# Patient Record
Sex: FEMALE | Race: BLACK OR AFRICAN AMERICAN | NOT HISPANIC OR LATINO | URBAN - METROPOLITAN AREA
[De-identification: names, ages, dates, MRNs, and addresses within clinical notes are randomized per-mention and may not be internally consistent; named-entity substitution may affect disease eponyms.]

---

## 2018-12-10 ENCOUNTER — INPATIENT (INPATIENT)
Facility: HOSPITAL | Age: 43
LOS: 0 days | Discharge: HOME | End: 2018-12-11
Attending: HOSPITALIST | Admitting: HOSPITALIST
Payer: MEDICARE

## 2018-12-10 VITALS
WEIGHT: 169.98 LBS | HEART RATE: 117 BPM | HEIGHT: 67 IN | TEMPERATURE: 98 F | DIASTOLIC BLOOD PRESSURE: 104 MMHG | RESPIRATION RATE: 24 BRPM | SYSTOLIC BLOOD PRESSURE: 164 MMHG

## 2018-12-10 DIAGNOSIS — I10 ESSENTIAL (PRIMARY) HYPERTENSION: ICD-10-CM

## 2018-12-10 DIAGNOSIS — M32.8 OTHER FORMS OF SYSTEMIC LUPUS ERYTHEMATOSUS: ICD-10-CM

## 2018-12-10 DIAGNOSIS — R11.2 NAUSEA WITH VOMITING, UNSPECIFIED: ICD-10-CM

## 2018-12-10 LAB
ALBUMIN SERPL ELPH-MCNC: 2.1 G/DL — LOW (ref 3.3–5)
ALP SERPL-CCNC: 117 U/L — SIGNIFICANT CHANGE UP (ref 40–120)
ALT FLD-CCNC: 18 U/L — SIGNIFICANT CHANGE UP (ref 12–78)
AMYLASE P1 CFR SERPL: 139 U/L — HIGH (ref 25–115)
ANION GAP SERPL CALC-SCNC: 5 MMOL/L — SIGNIFICANT CHANGE UP (ref 5–17)
APTT BLD: 42 SEC — HIGH (ref 28.5–37)
AST SERPL-CCNC: 24 U/L — SIGNIFICANT CHANGE UP (ref 15–37)
BASE EXCESS BLDV CALC-SCNC: -0.2 MMOL/L — SIGNIFICANT CHANGE UP (ref -2–2)
BASOPHILS # BLD AUTO: 0.01 K/UL — SIGNIFICANT CHANGE UP (ref 0–0.2)
BASOPHILS NFR BLD AUTO: 0.1 % — SIGNIFICANT CHANGE UP (ref 0–2)
BILIRUB SERPL-MCNC: 0.2 MG/DL — SIGNIFICANT CHANGE UP (ref 0.2–1.2)
BLOOD GAS COMMENTS, VENOUS: SIGNIFICANT CHANGE UP
BUN SERPL-MCNC: 16 MG/DL — SIGNIFICANT CHANGE UP (ref 7–23)
CALCIUM SERPL-MCNC: 7.9 MG/DL — LOW (ref 8.5–10.1)
CHLORIDE SERPL-SCNC: 113 MMOL/L — HIGH (ref 96–108)
CK MB BLD-MCNC: <1.4 % — SIGNIFICANT CHANGE UP (ref 0–3.5)
CK MB CFR SERPL CALC: <1 NG/ML — SIGNIFICANT CHANGE UP (ref 0.5–3.6)
CK SERPL-CCNC: 74 U/L — SIGNIFICANT CHANGE UP (ref 26–192)
CO2 SERPL-SCNC: 28 MMOL/L — SIGNIFICANT CHANGE UP (ref 22–31)
CREAT SERPL-MCNC: 0.75 MG/DL — SIGNIFICANT CHANGE UP (ref 0.5–1.3)
EOSINOPHIL # BLD AUTO: 0.03 K/UL — SIGNIFICANT CHANGE UP (ref 0–0.5)
EOSINOPHIL NFR BLD AUTO: 0.4 % — SIGNIFICANT CHANGE UP (ref 0–6)
GAS PNL BLDV: SIGNIFICANT CHANGE UP
GLUCOSE SERPL-MCNC: 93 MG/DL — SIGNIFICANT CHANGE UP (ref 70–99)
HCG SERPL-ACNC: 2 MIU/ML — SIGNIFICANT CHANGE UP
HCO3 BLDV-SCNC: 24 MMOL/L — SIGNIFICANT CHANGE UP (ref 21–29)
HCT VFR BLD CALC: 37 % — SIGNIFICANT CHANGE UP (ref 34.5–45)
HGB BLD-MCNC: 11.9 G/DL — SIGNIFICANT CHANGE UP (ref 11.5–15.5)
HOROWITZ INDEX BLDV+IHG-RTO: 21 — SIGNIFICANT CHANGE UP
IMM GRANULOCYTES NFR BLD AUTO: 0.3 % — SIGNIFICANT CHANGE UP (ref 0–1.5)
INR BLD: 1.08 RATIO — SIGNIFICANT CHANGE UP (ref 0.88–1.16)
LACTATE SERPL-SCNC: 1.5 MMOL/L — SIGNIFICANT CHANGE UP (ref 0.7–2)
LIDOCAIN IGE QN: 173 U/L — SIGNIFICANT CHANGE UP (ref 73–393)
LYMPHOCYTES # BLD AUTO: 0.32 K/UL — LOW (ref 1–3.3)
LYMPHOCYTES # BLD AUTO: 4.5 % — LOW (ref 13–44)
MCHC RBC-ENTMCNC: 29 PG — SIGNIFICANT CHANGE UP (ref 27–34)
MCHC RBC-ENTMCNC: 32.2 GM/DL — SIGNIFICANT CHANGE UP (ref 32–36)
MCV RBC AUTO: 90.2 FL — SIGNIFICANT CHANGE UP (ref 80–100)
MONOCYTES # BLD AUTO: 0.32 K/UL — SIGNIFICANT CHANGE UP (ref 0–0.9)
MONOCYTES NFR BLD AUTO: 4.5 % — SIGNIFICANT CHANGE UP (ref 2–14)
NEUTROPHILS # BLD AUTO: 6.47 K/UL — SIGNIFICANT CHANGE UP (ref 1.8–7.4)
NEUTROPHILS NFR BLD AUTO: 90.2 % — HIGH (ref 43–77)
NRBC # BLD: 0 /100 WBCS — SIGNIFICANT CHANGE UP (ref 0–0)
PCO2 BLDV: 39 MMHG — SIGNIFICANT CHANGE UP (ref 35–50)
PH BLDV: 7.4 — SIGNIFICANT CHANGE UP (ref 7.35–7.45)
PLATELET # BLD AUTO: 334 K/UL — SIGNIFICANT CHANGE UP (ref 150–400)
PO2 BLDV: <45 MMHG — SIGNIFICANT CHANGE UP (ref 25–45)
POTASSIUM SERPL-MCNC: 3.9 MMOL/L — SIGNIFICANT CHANGE UP (ref 3.5–5.3)
POTASSIUM SERPL-SCNC: 3.9 MMOL/L — SIGNIFICANT CHANGE UP (ref 3.5–5.3)
PROT SERPL-MCNC: 7.3 GM/DL — SIGNIFICANT CHANGE UP (ref 6–8.3)
PROTHROM AB SERPL-ACNC: 12.1 SEC — SIGNIFICANT CHANGE UP (ref 10–12.9)
RBC # BLD: 4.1 M/UL — SIGNIFICANT CHANGE UP (ref 3.8–5.2)
RBC # FLD: 13.4 % — SIGNIFICANT CHANGE UP (ref 10.3–14.5)
SAO2 % BLDV: 75 % — SIGNIFICANT CHANGE UP (ref 67–88)
SODIUM SERPL-SCNC: 146 MMOL/L — HIGH (ref 135–145)
TROPONIN I SERPL-MCNC: <.015 NG/ML — SIGNIFICANT CHANGE UP (ref 0.01–0.04)
WBC # BLD: 7.17 K/UL — SIGNIFICANT CHANGE UP (ref 3.8–10.5)
WBC # FLD AUTO: 7.17 K/UL — SIGNIFICANT CHANGE UP (ref 3.8–10.5)

## 2018-12-10 PROCEDURE — 93010 ELECTROCARDIOGRAM REPORT: CPT

## 2018-12-10 PROCEDURE — 99285 EMERGENCY DEPT VISIT HI MDM: CPT

## 2018-12-10 PROCEDURE — 74174 CTA ABD&PLVS W/CONTRAST: CPT | Mod: 26

## 2018-12-10 PROCEDURE — 71045 X-RAY EXAM CHEST 1 VIEW: CPT | Mod: 26

## 2018-12-10 PROCEDURE — 99223 1ST HOSP IP/OBS HIGH 75: CPT

## 2018-12-10 PROCEDURE — 71275 CT ANGIOGRAPHY CHEST: CPT | Mod: 26

## 2018-12-10 RX ORDER — LANOLIN ALCOHOL/MO/W.PET/CERES
3 CREAM (GRAM) TOPICAL AT BEDTIME
Qty: 0 | Refills: 0 | Status: DISCONTINUED | OUTPATIENT
Start: 2018-12-10 | End: 2018-12-11

## 2018-12-10 RX ORDER — LISINOPRIL 2.5 MG/1
40 TABLET ORAL DAILY
Qty: 0 | Refills: 0 | Status: DISCONTINUED | OUTPATIENT
Start: 2018-12-10 | End: 2018-12-11

## 2018-12-10 RX ORDER — SODIUM CHLORIDE 9 MG/ML
1000 INJECTION, SOLUTION INTRAVENOUS
Qty: 0 | Refills: 0 | Status: DISCONTINUED | OUTPATIENT
Start: 2018-12-10 | End: 2018-12-11

## 2018-12-10 RX ORDER — SODIUM CHLORIDE 9 MG/ML
1000 INJECTION INTRAMUSCULAR; INTRAVENOUS; SUBCUTANEOUS ONCE
Qty: 0 | Refills: 0 | Status: COMPLETED | OUTPATIENT
Start: 2018-12-10 | End: 2018-12-10

## 2018-12-10 RX ORDER — IBUPROFEN 200 MG
600 TABLET ORAL EVERY 6 HOURS
Qty: 0 | Refills: 0 | Status: DISCONTINUED | OUTPATIENT
Start: 2018-12-10 | End: 2018-12-11

## 2018-12-10 RX ORDER — ONDANSETRON 8 MG/1
4 TABLET, FILM COATED ORAL EVERY 6 HOURS
Qty: 0 | Refills: 0 | Status: DISCONTINUED | OUTPATIENT
Start: 2018-12-10 | End: 2018-12-11

## 2018-12-10 RX ORDER — HYDROXYCHLOROQUINE SULFATE 200 MG
100 TABLET ORAL
Qty: 0 | Refills: 0 | Status: DISCONTINUED | OUTPATIENT
Start: 2018-12-10 | End: 2018-12-11

## 2018-12-10 RX ORDER — MYCOPHENOLATE MOFETIL 250 MG/1
1500 CAPSULE ORAL DAILY
Qty: 0 | Refills: 0 | Status: DISCONTINUED | OUTPATIENT
Start: 2018-12-10 | End: 2018-12-11

## 2018-12-10 RX ORDER — HYDROMORPHONE HYDROCHLORIDE 2 MG/ML
0.5 INJECTION INTRAMUSCULAR; INTRAVENOUS; SUBCUTANEOUS ONCE
Qty: 0 | Refills: 0 | Status: DISCONTINUED | OUTPATIENT
Start: 2018-12-10 | End: 2018-12-10

## 2018-12-10 RX ORDER — PANTOPRAZOLE SODIUM 20 MG/1
40 TABLET, DELAYED RELEASE ORAL DAILY
Qty: 0 | Refills: 0 | Status: DISCONTINUED | OUTPATIENT
Start: 2018-12-10 | End: 2018-12-11

## 2018-12-10 RX ORDER — NICOTINE POLACRILEX 2 MG
2 GUM BUCCAL EVERY 8 HOURS
Qty: 0 | Refills: 0 | Status: DISCONTINUED | OUTPATIENT
Start: 2018-12-10 | End: 2018-12-11

## 2018-12-10 RX ORDER — HYDROMORPHONE HYDROCHLORIDE 2 MG/ML
1 INJECTION INTRAMUSCULAR; INTRAVENOUS; SUBCUTANEOUS ONCE
Qty: 0 | Refills: 0 | Status: DISCONTINUED | OUTPATIENT
Start: 2018-12-10 | End: 2018-12-10

## 2018-12-10 RX ORDER — NICOTINE POLACRILEX 2 MG
1 GUM BUCCAL DAILY
Qty: 0 | Refills: 0 | Status: DISCONTINUED | OUTPATIENT
Start: 2018-12-10 | End: 2018-12-11

## 2018-12-10 RX ADMIN — Medication 600 MILLIGRAM(S): at 21:29

## 2018-12-10 RX ADMIN — LISINOPRIL 40 MILLIGRAM(S): 2.5 TABLET ORAL at 18:21

## 2018-12-10 RX ADMIN — HYDROMORPHONE HYDROCHLORIDE 0.5 MILLIGRAM(S): 2 INJECTION INTRAMUSCULAR; INTRAVENOUS; SUBCUTANEOUS at 13:52

## 2018-12-10 RX ADMIN — HYDROMORPHONE HYDROCHLORIDE 1 MILLIGRAM(S): 2 INJECTION INTRAMUSCULAR; INTRAVENOUS; SUBCUTANEOUS at 14:06

## 2018-12-10 RX ADMIN — HYDROMORPHONE HYDROCHLORIDE 0.5 MILLIGRAM(S): 2 INJECTION INTRAMUSCULAR; INTRAVENOUS; SUBCUTANEOUS at 13:01

## 2018-12-10 RX ADMIN — SODIUM CHLORIDE 1000 MILLILITER(S): 9 INJECTION INTRAMUSCULAR; INTRAVENOUS; SUBCUTANEOUS at 13:51

## 2018-12-10 RX ADMIN — SODIUM CHLORIDE 100 MILLILITER(S): 9 INJECTION, SOLUTION INTRAVENOUS at 21:28

## 2018-12-10 RX ADMIN — HYDROMORPHONE HYDROCHLORIDE 1 MILLIGRAM(S): 2 INJECTION INTRAMUSCULAR; INTRAVENOUS; SUBCUTANEOUS at 14:40

## 2018-12-10 RX ADMIN — SODIUM CHLORIDE 2000 MILLILITER(S): 9 INJECTION INTRAMUSCULAR; INTRAVENOUS; SUBCUTANEOUS at 13:01

## 2018-12-10 RX ADMIN — Medication 30 MILLIGRAM(S): at 21:28

## 2018-12-10 RX ADMIN — Medication 600 MILLIGRAM(S): at 22:29

## 2018-12-10 RX ADMIN — ONDANSETRON 4 MILLIGRAM(S): 8 TABLET, FILM COATED ORAL at 21:28

## 2018-12-10 NOTE — H&P ADULT - ASSESSMENT
43d with sle with acute flare requiring at least two days of hospitalization     IMPROVE VTE Individual Risk Assessment    RISK                                                          Points  [] Previous VTE                                           3  [] Thrombophilia                                        2  [] Lower limb paralysis                              2   [] Current Cancer                                       2   [] Immobilization > 24 hrs                        1  [] ICU/CCU stay > 24 hours                       1  [] Age > 60                                                   1    IMPROVE VTE Score:0

## 2018-12-10 NOTE — H&P ADULT - HISTORY OF PRESENT ILLNESS
42 yo female with history of lupus presents with total body pain, abdominal pain, sob, nausea, vomiting, diarrhea since yesterday. Patient states she has had fever, chills. Patient denies recent travel, rash. Pain 10/10. Patient states she has rib pain. 44 yo female with history of lupus presents with total body pain, abdominal pain, sob, nausea, vomiting, diarrhea since yesterday. Patient states she has had fever, chills. Patient denies recent travel, rash. Pain 10/10. Patient states she has rib pain. - most of the pain is in her chest pain from vomiting

## 2018-12-10 NOTE — ED ADULT NURSE NOTE - NSIMPLEMENTINTERV_GEN_ALL_ED
Implemented All Universal Safety Interventions:  [place] to call system. Call bell, personal items and telephone within reach. Instruct patient to call for assistance. Room bathroom lighting operational. Non-slip footwear when patient is off stretcher. Physically safe environment: no spills, clutter or unnecessary equipment. Stretcher in lowest position, wheels locked, appropriate side rails in place.

## 2018-12-10 NOTE — ED ADULT NURSE NOTE - OBJECTIVE STATEMENT
PT is a 43YOF who is here for abdominal pain, flank pain bilaterally. Pt states that she chronically has pain due to her lupus, but today the pain is "uncontrollable". Pt states that her pain is causing her to have difficulty breathing and it is causing her to have pain in her sides. Pt is in distress and is using her accessory muscles to breathe.

## 2018-12-10 NOTE — ED ADULT NURSE REASSESSMENT NOTE - NS ED NURSE REASSESS COMMENT FT1
Report given to receiving RN,pts history, current condition and reason for admission discussed and reviewed, pt currently in stable condition, IV patent and running, dressing is clean dry and intact, pt aware of plan of care. Pt education deemed successful afte successful patient teach back proves proficiency.

## 2018-12-10 NOTE — CONSULT NOTE ADULT - SUBJECTIVE AND OBJECTIVE BOX
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  Yesterday, she began to experience severe diffuse pain throughout her body, including pleuritic chest pain, causing difficulty taking deep breaths.  THe pain then progressively worsened, prompting her to come in to the ED.  Of note, pt had been on Plaquenil and Cellcept, but has has been off both since running out of them (off Cellcept for about 6 months, and off Plaquenil abut 1 month).  Of note, pt dx'ed w/ SLE at age 17 - initially experienced joint pains and rashes, followed by oral ulcers, and alopecia.  Several years ago, she was diagnosed with lupus nephritis.  She was treated initially with Cellcept, but did not respond adequately, so she was then treated with Cytoxan, after which she was put back on Cellcept for maintenance treatment.      PAST MEDICAL & SURGICAL HISTORY:  Essential hypertension  Lupus  left THR      ROS: No current oral ulcers, rashes, alopecia, photosensitivity, dry eye/dry mouth, Raynaud's, dysphagia, focal weakness, or eye symptoms.    MEDICATIONS  (STANDING):  dextrose 5% + sodium chloride 0.9%. 1000 milliLiter(s) (100 mL/Hr) IV Continuous <Continuous>  hydroxychloroquine 100 milliGRAM(s) Oral two times a day  lisinopril 40 milliGRAM(s) Oral daily  mycophenolate mofetil 1500 milliGRAM(s) Oral daily  nicotine -  14 mG/24Hr(s) Patch 1 patch Transdermal daily  pantoprazole  Injectable 40 milliGRAM(s) IV Push daily    MEDICATIONS  (PRN):  nicotine  Polacrilex Gum 2 milliGRAM(s) Oral every 8 hours PRN breakthrough cravings  ondansetron Injectable 4 milliGRAM(s) IV Push every 6 hours PRN Nausea and/or Vomiting      Allergies    Imuran (Other)    Intolerances        FAMILY HISTORY:      SOCIAL HISTORY:  Tobacco--   none            Vital Signs Last 24 Hrs  T(C): 37 (10 Dec 2018 17:59), Max: 37 (10 Dec 2018 17:59)  T(F): 98.6 (10 Dec 2018 17:59), Max: 98.6 (10 Dec 2018 17:59)  HR: 96 (10 Dec 2018 17:59) (92 - 117)  BP: 168/99 (10 Dec 2018 17:59) (125/85 - 168/99)  BP(mean): --  RR: 18 (10 Dec 2018 17:59) (18 - 24)  SpO2: 99% (10 Dec 2018 17:59) (97% - 99%)    PHYSICAL EXAM:  General :  NAD  HEENT--  no oral ulcers  Nodes--   LAD  Lungs--  CTA B/L  Heart--  RRR, nlS1 &S2 normal;   Abdomen--  soft, NT, ND +BS  Skin:  chronic-appearing facial rash  Musculoskeletal exam:  (+)swelling/tenderness in all B/L PIP's, wrists;  (+)tenderness in all hand joints, B/L elbows, B/L knees, B/L ankles    LABS:                        11.9   7.17  )-----------( 334      ( 10 Dec 2018 13:11 )             37.0     12-10    146<H>  |  113<H>  |  16  ----------------------------<  93  3.9   |  28  |  0.75    Ca    7.9<L>      10 Dec 2018 13:11    TPro  7.3  /  Alb  2.1<L>  /  TBili  0.2  /  DBili  x   /  AST  24  /  ALT  18  /  AlkPhos  117  12-10    PT/INR - ( 10 Dec 2018 13:11 )   PT: 12.1 sec;   INR: 1.08 ratio         PTT - ( 10 Dec 2018 13:11 )  PTT:42.0 sec      RADIOLOGY & ADDITIONAL STUDIES:  < from: CT Angio Chest w/ IV Cont (12.10.18 @ 15:28) >  EXAM:  CT ANGIO ABD PELV (W)AW IC                          EXAM:  CT ANGIO CHEST (W)AW IC                            PROCEDURE DATE:  12/10/2018          INTERPRETATION:  CLINICAL INFORMATION: Shortness of breath, nausea,   vomiting, and abdominal pain    COMPARISON: None.    PROCEDURE:   CT Angiography of the Chest, Abdomen and Pelvis.   Precontrast imaging was performed through the chest followed by arterial   phase imaging of the chest, abdomen and pelvis.  Intravenous contrast: 90 ml Omnipaque 350. 10 ml discarded.  Oral contrast:None.  Sagittal and coronal reformats were performed as well as 3D (MIP)   reconstructions.    FINDINGS:    CHEST:     LUNGS AND LARGE AIRWAYS: Patent central airways. Bilateral dependent and   basilar atelectasis.  PLEURA: No pleural effusion.  VESSELS: No thoracic aortic mural hematoma, aneurysm, or dissection. No   central pulmonary filling defects identified.  HEART: Heart size is normal. No pericardial effusion.  MEDIASTINUM AND FLORINDA: Several prominentlymph nodes.  CHEST WALL AND LOWER NECK: Enlarged axillary lymph nodes.    ABDOMEN AND PELVIS:    LIVER: Within normal limits.  BILE DUCTS: Normal caliber.  GALLBLADDER: Within normal limits.  SPLEEN: Within normal limits.  PANCREAS: Within normal limits.  ADRENALS: Within normal limits.  KIDNEYS/URETERS: Slightly heterogeneous renal enhancement. Nonobstructive   renal calculi measuring up to 3 mm. No hydronephrosis.    BLADDER: Within normal limits.  REPRODUCTIVE ORGANS: Fibroid uterus.    BOWEL: No bowel obstruction. Appendix within normal limits.  PERITONEUM: Mild pelvic fluid.  VESSELS:  The abdominal aorta demonstrates a normal course and caliber.   There is no evidence for abdominal aortic aneurysm or intraluminal flap   to suggest aortic dissection. No periaortic fluid collections are seen.   The origins of the celiac artery, superior mesenteric artery, bilateral   renal arteries, and inferior mesenteric artery are patent. Common hepatic   artery arises from the superior mesentericartery (normal variant).  RETROPERITONEUM: Mildly enlarged retroperitoneal and pelvic lymph nodes.    ABDOMINAL WALL: Tiny fat-containing umbilical hernia.  BONES: Right femoral head avascular necrosis. Status post left hip   arthroplasty. Mild spinal degenerative changes.    IMPRESSION:     No aortic aneurysm or dissection.    < end of copied text >

## 2018-12-10 NOTE — CONSULT NOTE ADULT - ASSESSMENT
43 year old female presents with abdominal symptoms (abd pain, nausea, diarrhea), arthritis, and diffuse pain, including pleuritic chest pain.  Overall presentation most c/w SLE flare, though nausea and diarrhea are less typical of SLE flares, masoud given unremarkable CT abdomen.  Suspect abdominal symptoms may be due to viral gastroenteritis, which could possibly be the underlying trigger of her current flare.    - Will start Solumedrol 30mg IV daily.    - Cont Plaquenil, Cellcept.    - Would try to obtain recent records from her outside rheumatologist.    - Supportive care for likely gastroenteritis.    - Analgesia as per primary team.    - Will order labs, including dsDNA, C3, C4, UA, U Pr/Cr
cervical collar

## 2018-12-10 NOTE — ED PROVIDER NOTE - OBJECTIVE STATEMENT
44 yo female with history of lupus presents with total body pain, abdominal pain, sob, nausea, vomiting, diarrhea since yesterday. Patient states she has had fever, chills. Patient denies recent travel, rash. Pain 10/10. Patient states she has rib pain.

## 2018-12-10 NOTE — H&P ADULT - NSHPLABSRESULTS_GEN_ALL_CORE
11.9   7.17  )-----------( 334      ( 10 Dec 2018 13:11 )             37.0   12-10    146<H>  |  113<H>  |  16  ----------------------------<  93  3.9   |  28  |  0.75    Ca    7.9<L>      10 Dec 2018 13:11    TPro  7.3  /  Alb  2.1<L>  /  TBili  0.2  /  DBili  x   /  AST  24  /  ALT  18  /  AlkPhos  117  12-10  < from: CT Angio Chest w/ IV Cont (12.10.18 @ 15:28) >    FINDINGS:    CHEST:     LUNGS AND LARGE AIRWAYS: Patent central airways. Bilateral dependent and   basilar atelectasis.  PLEURA: No pleural effusion.  VESSELS: No thoracic aortic mural hematoma, aneurysm, or dissection. No   central pulmonary filling defects identified.  HEART: Heart size is normal. No pericardial effusion.  MEDIASTINUM AND FLORINDA: Several prominentlymph nodes.  CHEST WALL AND LOWER NECK: Enlarged axillary lymph nodes.    ABDOMEN AND PELVIS:    LIVER: Within normal limits.  BILE DUCTS: Normal caliber.  GALLBLADDER: Within normal limits.  SPLEEN: Within normal limits.  PANCREAS: Within normal limits.  ADRENALS: Within normal limits.  KIDNEYS/URETERS: Slightly heterogeneous renal enhancement. Nonobstructive   renal calculi measuring up to 3 mm. No hydronephrosis.    BLADDER: Within normal limits.  REPRODUCTIVE ORGANS: Fibroid uterus.    BOWEL: No bowel obstruction. Appendix within normal limits.  PERITONEUM: Mild pelvic fluid.  VESSELS:  The abdominal aorta demonstrates a normal course and caliber.   There is no evidence for abdominal aortic aneurysm or intraluminal flap   to suggest aortic dissection. No periaortic fluid collections are seen.   The origins of the celiac artery, superior mesenteric artery, bilateral   renal arteries, and inferior mesenteric artery are patent. Common hepatic   artery arises from the superior mesentericartery (normal variant).  RETROPERITONEUM: Mildly enlarged retroperitoneal and pelvic lymph nodes.    ABDOMINAL WALL: Tiny fat-containing umbilical hernia.  BONES: Right femoral head avascular necrosis. Status post left hip   arthroplasty. Mild spinal degenerative changes.    IMPRESSION:     No aortic aneurysm or dissection.

## 2018-12-10 NOTE — ED PROVIDER NOTE - CARE PLAN
Principal Discharge DX:	Lupus (systemic lupus erythematosus)  Secondary Diagnosis:	Nausea & vomiting

## 2018-12-11 ENCOUNTER — TRANSCRIPTION ENCOUNTER (OUTPATIENT)
Age: 43
End: 2018-12-11

## 2018-12-11 VITALS
OXYGEN SATURATION: 100 % | DIASTOLIC BLOOD PRESSURE: 83 MMHG | HEART RATE: 73 BPM | RESPIRATION RATE: 17 BRPM | TEMPERATURE: 98 F | SYSTOLIC BLOOD PRESSURE: 146 MMHG

## 2018-12-11 LAB
ANION GAP SERPL CALC-SCNC: 7 MMOL/L — SIGNIFICANT CHANGE UP (ref 5–17)
BUN SERPL-MCNC: 11 MG/DL — SIGNIFICANT CHANGE UP (ref 7–23)
C3 SERPL-MCNC: 154 MG/DL — SIGNIFICANT CHANGE UP (ref 81–157)
C4 SERPL-MCNC: 36 MG/DL — SIGNIFICANT CHANGE UP (ref 13–39)
CALCIUM SERPL-MCNC: 7.9 MG/DL — LOW (ref 8.5–10.1)
CHLORIDE SERPL-SCNC: 114 MMOL/L — HIGH (ref 96–108)
CO2 SERPL-SCNC: 24 MMOL/L — SIGNIFICANT CHANGE UP (ref 22–31)
CREAT SERPL-MCNC: 0.73 MG/DL — SIGNIFICANT CHANGE UP (ref 0.5–1.3)
GLUCOSE SERPL-MCNC: 127 MG/DL — HIGH (ref 70–99)
HCT VFR BLD CALC: 32 % — LOW (ref 34.5–45)
HGB BLD-MCNC: 10.4 G/DL — LOW (ref 11.5–15.5)
MCHC RBC-ENTMCNC: 29.1 PG — SIGNIFICANT CHANGE UP (ref 27–34)
MCHC RBC-ENTMCNC: 32.5 GM/DL — SIGNIFICANT CHANGE UP (ref 32–36)
MCV RBC AUTO: 89.6 FL — SIGNIFICANT CHANGE UP (ref 80–100)
NRBC # BLD: 0 /100 WBCS — SIGNIFICANT CHANGE UP (ref 0–0)
PLATELET # BLD AUTO: 291 K/UL — SIGNIFICANT CHANGE UP (ref 150–400)
POTASSIUM SERPL-MCNC: 4 MMOL/L — SIGNIFICANT CHANGE UP (ref 3.5–5.3)
POTASSIUM SERPL-SCNC: 4 MMOL/L — SIGNIFICANT CHANGE UP (ref 3.5–5.3)
RBC # BLD: 3.57 M/UL — LOW (ref 3.8–5.2)
RBC # FLD: 13.4 % — SIGNIFICANT CHANGE UP (ref 10.3–14.5)
SODIUM SERPL-SCNC: 145 MMOL/L — SIGNIFICANT CHANGE UP (ref 135–145)
WBC # BLD: 4.65 K/UL — SIGNIFICANT CHANGE UP (ref 3.8–10.5)
WBC # FLD AUTO: 4.65 K/UL — SIGNIFICANT CHANGE UP (ref 3.8–10.5)

## 2018-12-11 PROCEDURE — 99239 HOSP IP/OBS DSCHRG MGMT >30: CPT

## 2018-12-11 RX ORDER — HYDROXYCHLOROQUINE SULFATE 200 MG
1 TABLET ORAL
Qty: 0 | Refills: 0 | COMMUNITY

## 2018-12-11 RX ORDER — LISINOPRIL 2.5 MG/1
1 TABLET ORAL
Qty: 30 | Refills: 0 | OUTPATIENT
Start: 2018-12-11 | End: 2019-01-09

## 2018-12-11 RX ORDER — MYCOPHENOLATE MOFETIL 250 MG/1
2 CAPSULE ORAL
Qty: 120 | Refills: 0 | OUTPATIENT
Start: 2018-12-11 | End: 2019-01-09

## 2018-12-11 RX ORDER — NICOTINE POLACRILEX 2 MG
1 GUM BUCCAL
Qty: 2 | Refills: 0 | OUTPATIENT
Start: 2018-12-11 | End: 2019-01-09

## 2018-12-11 RX ORDER — POLYETHYLENE GLYCOL 3350 17 G/17G
17 POWDER, FOR SOLUTION ORAL DAILY
Qty: 0 | Refills: 0 | Status: DISCONTINUED | OUTPATIENT
Start: 2018-12-11 | End: 2018-12-11

## 2018-12-11 RX ORDER — HYDROXYCHLOROQUINE SULFATE 200 MG
1 TABLET ORAL
Qty: 30 | Refills: 0 | OUTPATIENT
Start: 2018-12-11 | End: 2019-01-09

## 2018-12-11 RX ORDER — LISINOPRIL 2.5 MG/1
1 TABLET ORAL
Qty: 0 | Refills: 0 | COMMUNITY

## 2018-12-11 RX ORDER — HYDROXYCHLOROQUINE SULFATE 200 MG
100 TABLET ORAL
Qty: 0 | Refills: 0 | COMMUNITY

## 2018-12-11 RX ORDER — MYCOPHENOLATE MOFETIL 250 MG/1
1500 CAPSULE ORAL
Qty: 0 | Refills: 0 | COMMUNITY

## 2018-12-11 RX ORDER — ENOXAPARIN SODIUM 100 MG/ML
40 INJECTION SUBCUTANEOUS EVERY 24 HOURS
Qty: 0 | Refills: 0 | Status: DISCONTINUED | OUTPATIENT
Start: 2018-12-11 | End: 2018-12-11

## 2018-12-11 RX ADMIN — MYCOPHENOLATE MOFETIL 1500 MILLIGRAM(S): 250 CAPSULE ORAL at 11:03

## 2018-12-11 RX ADMIN — LISINOPRIL 40 MILLIGRAM(S): 2.5 TABLET ORAL at 06:13

## 2018-12-11 RX ADMIN — PANTOPRAZOLE SODIUM 40 MILLIGRAM(S): 20 TABLET, DELAYED RELEASE ORAL at 11:03

## 2018-12-11 RX ADMIN — Medication 100 MILLIGRAM(S): at 06:13

## 2018-12-11 RX ADMIN — Medication 30 MILLIGRAM(S): at 06:12

## 2018-12-11 RX ADMIN — ENOXAPARIN SODIUM 40 MILLIGRAM(S): 100 INJECTION SUBCUTANEOUS at 11:49

## 2018-12-11 NOTE — DISCHARGE NOTE ADULT - CARE PLAN
Principal Discharge DX:	Systemic lupus erythematosus, unspecified SLE type, unspecified organ involvement status  Goal:	no flair  Assessment and plan of treatment:	please make an appointment to see a rheumatologist  Secondary Diagnosis:	Essential hypertension  Assessment and plan of treatment:	please take lisinopril and se a PMD

## 2018-12-11 NOTE — DISCHARGE NOTE ADULT - PATIENT PORTAL LINK FT
You can access the [facility] Patient Portal, offered by [facility], by registering with the following website: [email or web address]

## 2018-12-11 NOTE — DISCHARGE NOTE ADULT - HOSPITAL COURSE
History of Present Illness:  Reason for Admission: lupus flare	  History of Present Illness: 	  42 yo female with history of lupus presents with total body pain, abdominal pain, sob, nausea, vomiting, diarrhea since yesterday. Patient states she has had fever, chills. Patient denies recent travel, rash. Pain 10/10. Patient states she has rib pain. - most of the pain is in her chest pain from vomiting     Paatient was observed in hospital   given solumedrol IV  all ct scans were negative with normal wbc count and patient requested discharge home      Lupus was discussed and patient was urged to take her medications and see  a rheumatologist and card was given

## 2018-12-11 NOTE — DISCHARGE NOTE ADULT - MEDICATION SUMMARY - MEDICATIONS TO CHANGE
I will SWITCH the dose or number of times a day I take the medications listed below when I get home from the hospital:    lisinopril 40 mg oral tablet  -- 1 tab(s) by mouth once a day    Plaquenil  -- 100 milligram(s) by mouth 2 times a day    CellCept  -- 1500 milligram(s) by mouth once a day

## 2018-12-11 NOTE — CHART NOTE - NSCHARTNOTEFT_GEN_A_CORE
To whom It may concern:      Patient Kennedi Terrazas ( 1975) Has been hospitalized at  [facility] from 12/10 -2018. She is clear to return to work without restrictions   If ic an be of a [place] assistance please do not hesitate to call     Eusebio Nolan MD   [phone]

## 2018-12-11 NOTE — DISCHARGE NOTE ADULT - CARE PROVIDER_API CALL
Jabari Power), Internal Medicine; Rheumatology  [address] [place] [address]  Phone: [phone]  Fax: [phone]    Kevin Moss), Internal Medicine  [address] [place] [address]  Phone: [phone]  Fax: [phone]

## 2018-12-11 NOTE — DISCHARGE NOTE ADULT - CARE PROVIDERS DIRECT ADDRESSES
[email or web address]

## 2018-12-11 NOTE — DISCHARGE NOTE ADULT - MEDICATION SUMMARY - MEDICATIONS TO TAKE
I will START or STAY ON the medications listed below when I get home from the hospital:    Deltasone 20 mg oral tablet  -- 1 tab(s) by mouth once a day   -- It is very important that you take or use this exactly as directed.  Do not skip doses or discontinue unless directed by your doctor.  Obtain medical advice before taking any non-prescription drugs as some may affect the action of this medication.  Take with food or milk.    -- Indication: For LUPUS, SYSTEMIC LUPUS ERYTHEMATOSUS, NAUSEA, VOMITTING    Prinivil 10 mg oral tablet  -- 1 tab(s) by mouth once a day  -- Indication: For Essential hypertension    hydroxychloroquine 200 mg oral tablet  -- 1 tab(s) by mouth once a day  -- Indication: For Lupus (systemic lupus erythematosus)    CellCept 500 mg oral tablet  -- 2 tab(s) by mouth 2 times a day  -- Indication: For Lupus (systemic lupus erythematosus)    Nicoderm C-Q Clear 14 mg/24 hr transdermal film, extended release  -- 1 film(s) by transdermal patch once a day   -- Indication: For tobacco abuse

## 2018-12-12 LAB — DSDNA AB SER-ACNC: 658 IU/ML — HIGH

## 2018-12-13 DIAGNOSIS — M32.9 SYSTEMIC LUPUS ERYTHEMATOSUS, UNSPECIFIED: ICD-10-CM

## 2018-12-13 DIAGNOSIS — M87.852 OTHER OSTEONECROSIS, LEFT FEMUR: ICD-10-CM

## 2018-12-13 DIAGNOSIS — F17.210 NICOTINE DEPENDENCE, CIGARETTES, UNCOMPLICATED: ICD-10-CM

## 2018-12-13 DIAGNOSIS — A08.4 VIRAL INTESTINAL INFECTION, UNSPECIFIED: ICD-10-CM

## 2018-12-13 DIAGNOSIS — M32.14 GLOMERULAR DISEASE IN SYSTEMIC LUPUS ERYTHEMATOSUS: ICD-10-CM

## 2018-12-13 DIAGNOSIS — I10 ESSENTIAL (PRIMARY) HYPERTENSION: ICD-10-CM

## 2018-12-15 LAB
CULTURE RESULTS: SIGNIFICANT CHANGE UP
CULTURE RESULTS: SIGNIFICANT CHANGE UP
SPECIMEN SOURCE: SIGNIFICANT CHANGE UP
SPECIMEN SOURCE: SIGNIFICANT CHANGE UP

## 2023-05-15 NOTE — ED ADULT NURSE NOTE - NS ED NURSE PATIENT LEFT UNIT TIME
Detail Level: Detailed Add 58780 Cpt? (Important Note: In 2017 The Use Of 03415 Is Being Tracked By Cms To Determine Future Global Period Reimbursement For Global Periods): no 17:46

## 2024-03-19 NOTE — DISCHARGE NOTE ADULT - SECONDARY DIAGNOSIS.
No protocol for requested medication.    Medication: 1/29/2024 last refilled   meclizine (ANTIVERT) 25 MG tablet     Sig: TAKE 1 TABLET BY MOUTH THREE TIMES DAILY AS NEEDED FOR DIZZINESS. NO DRIVING OR OPERATING MACHINERY WHEN USING    Disp: 30 tablet    Refills: 2 (Pharmacy requested: Not specified)    Start: 3/19/2024     Last office visit date: 11/18/2023   Pharmacy: [id number] [place], [place] - [address]    Order pended, routed to clinician for review.      Essential hypertension